# Patient Record
Sex: FEMALE | Race: BLACK OR AFRICAN AMERICAN | NOT HISPANIC OR LATINO | Employment: UNEMPLOYED | URBAN - METROPOLITAN AREA
[De-identification: names, ages, dates, MRNs, and addresses within clinical notes are randomized per-mention and may not be internally consistent; named-entity substitution may affect disease eponyms.]

---

## 2021-05-10 ENCOUNTER — PATIENT MESSAGE (OUTPATIENT)
Dept: RESEARCH | Facility: HOSPITAL | Age: 21
End: 2021-05-10

## 2023-10-15 ENCOUNTER — PATIENT MESSAGE (OUTPATIENT)
Dept: ADMINISTRATIVE | Facility: OTHER | Age: 23
End: 2023-10-15
Payer: MEDICAID

## 2024-10-09 ENCOUNTER — NURSE TRIAGE (OUTPATIENT)
Dept: ADMINISTRATIVE | Facility: CLINIC | Age: 24
End: 2024-10-09
Payer: MEDICAID

## 2024-10-09 NOTE — TELEPHONE ENCOUNTER
"[place]    PCP:  None    C/O Lt wrist pain rated 8/10, Lt hand weakness, Lt wrist swelling, and not able to move her thumb.  She has had a x-ray and it's not broken.  She has tried Ibuprofen without relief.  Denies fever, injury, SOB, CP, neck pain, numbness/tingling, and rash.  Per protocol, care advised is go to office now.  NT contacted scheduling for appt with Ortho.  Ortho doesn't have any available appts within timeframe of dispo therefore advised to be seen either by [facility] (instruction provided)/[facility].  Pt VU.  Advised to call for worsening/questions/concerns.  VU.  Unable to route d/t no PCP.    Reason for Disposition   SEVERE pain (e.g., excruciating, unable to use wrist at all)    Additional Information   Negative: Similar pain previously and it was from "heart attack"   Negative: Similar pain previously from 'angina' and not relieved by nitroglycerin   Negative: Sounds like a life-threatening emergency to the triager   Negative: Swollen joint and fever   Negative: Red area or streak and fever   Negative: Patient sounds very sick or weak to the triager    Protocols used: Wrist Pain-A-[place]    "